# Patient Record
Sex: MALE | Race: WHITE | NOT HISPANIC OR LATINO
[De-identification: names, ages, dates, MRNs, and addresses within clinical notes are randomized per-mention and may not be internally consistent; named-entity substitution may affect disease eponyms.]

---

## 2023-01-12 PROBLEM — Z00.00 ENCOUNTER FOR PREVENTIVE HEALTH EXAMINATION: Status: ACTIVE | Noted: 2023-01-12

## 2023-01-19 ENCOUNTER — APPOINTMENT (OUTPATIENT)
Dept: NEUROSURGERY | Facility: CLINIC | Age: 58
End: 2023-01-19
Payer: SELF-PAY

## 2023-01-19 ENCOUNTER — APPOINTMENT (OUTPATIENT)
Dept: NEUROSURGERY | Facility: CLINIC | Age: 58
End: 2023-01-19

## 2023-01-19 PROCEDURE — EDU01: CPT

## 2023-01-20 NOTE — REASON FOR VISIT
[Medical Office: ([facility])___] : at the medical office located in  [Participant] : the participant [Other Location: e.g. School (Enter Location, City,State)___] : at [unfilled], at the time of the educational consult. [Self] : self [Follow-Up: _____] : a [unfilled] follow-up visit [FreeTextEntry5] : 57, Male [FreeTextEntry6] : lumbar pain, history of lumbar fusion [FreeTextEntry1] : 57 year old gentleman who has had a previous lumbar fusion, w/new onset pain with radiation posteriorly down midline of right leg. \par \par He had a spinal chord stimulator placed by Dr. Su and states that until recently it was working. He has been unable to get a new MRI due to battery compatability issue.\par \par A review of his new images occurred. He was informed that the previous fusion is solid. There is some arthritic changes at the levels directly above.\par \par Due to his inability to get a MRI at this time, I recommend a CT myelogram of his thoracic and lumbar spine as well as a full length standing scoliosis film.\par \par As per patients request, this office will reach out to his primary care physician Dr. Duncan to help obtain.\par \par Patient is currently dealing with other medical issues such as rectal bleeding and will obtain films as soon as his work up is completed.\par \par Patient will follow up once images are obtained.\par

## 2023-02-13 ENCOUNTER — APPOINTMENT (OUTPATIENT)
Dept: RADIOLOGY | Facility: HOSPITAL | Age: 58
End: 2023-02-13

## 2023-02-13 ENCOUNTER — OUTPATIENT (OUTPATIENT)
Dept: OUTPATIENT SERVICES | Facility: HOSPITAL | Age: 58
LOS: 1 days | End: 2023-02-13
Payer: COMMERCIAL

## 2023-02-13 ENCOUNTER — NON-APPOINTMENT (OUTPATIENT)
Age: 58
End: 2023-02-13

## 2023-02-13 ENCOUNTER — RESULT REVIEW (OUTPATIENT)
Age: 58
End: 2023-02-13

## 2023-02-13 ENCOUNTER — APPOINTMENT (OUTPATIENT)
Dept: NEUROSURGERY | Facility: CLINIC | Age: 58
End: 2023-02-13
Payer: COMMERCIAL

## 2023-02-13 DIAGNOSIS — M54.16 RADICULOPATHY, LUMBAR REGION: ICD-10-CM

## 2023-02-13 PROCEDURE — 72129 CT CHEST SPINE W/DYE: CPT

## 2023-02-13 PROCEDURE — 99204 OFFICE O/P NEW MOD 45 MIN: CPT

## 2023-02-13 PROCEDURE — 62305 MYELOGRAPHY LUMBAR INJECTION: CPT

## 2023-02-13 PROCEDURE — 72129 CT CHEST SPINE W/DYE: CPT | Mod: 26

## 2023-02-13 PROCEDURE — 72132 CT LUMBAR SPINE W/DYE: CPT | Mod: 26

## 2023-02-13 PROCEDURE — 72132 CT LUMBAR SPINE W/DYE: CPT

## 2023-02-14 ENCOUNTER — APPOINTMENT (OUTPATIENT)
Dept: NEUROSURGERY | Facility: CLINIC | Age: 58
End: 2023-02-14
Payer: COMMERCIAL

## 2023-02-14 ENCOUNTER — NON-APPOINTMENT (OUTPATIENT)
Age: 58
End: 2023-02-14

## 2023-02-14 VITALS
OXYGEN SATURATION: 95 % | WEIGHT: 265 LBS | HEIGHT: 73 IN | BODY MASS INDEX: 35.12 KG/M2 | SYSTOLIC BLOOD PRESSURE: 126 MMHG | DIASTOLIC BLOOD PRESSURE: 83 MMHG | HEART RATE: 75 BPM

## 2023-02-14 DIAGNOSIS — Z96.89 PRESENCE OF OTHER SPECIFIED FUNCTIONAL IMPLANTS: ICD-10-CM

## 2023-02-14 DIAGNOSIS — Z87.891 PERSONAL HISTORY OF NICOTINE DEPENDENCE: ICD-10-CM

## 2023-02-14 DIAGNOSIS — Z82.49 FAMILY HISTORY OF ISCHEMIC HEART DISEASE AND OTHER DISEASES OF THE CIRCULATORY SYSTEM: ICD-10-CM

## 2023-02-14 DIAGNOSIS — Z86.79 PERSONAL HISTORY OF OTHER DISEASES OF THE CIRCULATORY SYSTEM: ICD-10-CM

## 2023-02-14 DIAGNOSIS — Z78.9 OTHER SPECIFIED HEALTH STATUS: ICD-10-CM

## 2023-02-14 DIAGNOSIS — I10 ESSENTIAL (PRIMARY) HYPERTENSION: ICD-10-CM

## 2023-02-14 PROCEDURE — 99215 OFFICE O/P EST HI 40 MIN: CPT

## 2023-02-14 RX ORDER — VITAMIN D3/FOLIC ACID 95 MCG-1MG
TABLET ORAL
Refills: 0 | Status: ACTIVE | COMMUNITY

## 2023-02-14 RX ORDER — SIMVASTATIN 20 MG/1
20 TABLET, FILM COATED ORAL
Refills: 0 | Status: ACTIVE | COMMUNITY

## 2023-02-14 RX ORDER — GLUCOSAMINE/MSM/CHONDROIT SULF 500-166.6
10 TABLET ORAL
Refills: 0 | Status: ACTIVE | COMMUNITY

## 2023-02-14 RX ORDER — POTASSIUM CITRATE 15 MEQ/1
15 MEQ TABLET, EXTENDED RELEASE ORAL
Refills: 0 | Status: ACTIVE | COMMUNITY

## 2023-02-14 RX ORDER — CHLORTHALIDONE 25 MG/1
25 TABLET ORAL
Refills: 0 | Status: ACTIVE | COMMUNITY

## 2023-02-14 RX ORDER — CYCLOBENZAPRINE HYDROCHLORIDE 10 MG/1
10 TABLET, FILM COATED ORAL
Refills: 0 | Status: ACTIVE | COMMUNITY

## 2023-02-14 RX ORDER — ALLOPURINOL 100 MG/1
100 TABLET ORAL
Refills: 0 | Status: ACTIVE | COMMUNITY

## 2023-02-14 RX ORDER — OXYBUTYNIN CHLORIDE 5 MG/1
5 TABLET ORAL
Refills: 0 | Status: ACTIVE | COMMUNITY

## 2023-02-14 RX ORDER — ERYTHROMYCIN 5 MG/G
5 OINTMENT OPHTHALMIC
Refills: 0 | Status: ACTIVE | COMMUNITY

## 2023-02-14 RX ORDER — CHLORHEXIDINE GLUCONATE 4 %
LIQUID (ML) TOPICAL
Refills: 0 | Status: ACTIVE | COMMUNITY

## 2023-02-14 RX ORDER — PHENAZOPYRIDINE HYDROCHLORIDE 200 MG/1
200 TABLET ORAL
Refills: 0 | Status: ACTIVE | COMMUNITY

## 2023-02-14 RX ORDER — ACETAMINOPHEN 500 MG
TABLET ORAL
Refills: 0 | Status: ACTIVE | COMMUNITY

## 2023-02-14 RX ORDER — IBUPROFEN 600 MG/1
600 TABLET, FILM COATED ORAL
Refills: 0 | Status: ACTIVE | COMMUNITY

## 2023-02-14 NOTE — PHYSICAL EXAM
[General Appearance - Alert] : alert [General Appearance - In No Acute Distress] : in no acute distress [General Appearance - Well Nourished] : well nourished [General Appearance - Well-Appearing] : healthy appearing [Oriented To Time, Place, And Person] : oriented to person, place, and time [Affect] : the affect was normal [Mood] : the mood was normal [Person] : oriented to person [Place] : oriented to place [Time] : oriented to time [Cranial Nerves Oculomotor (III)] : extraocular motion intact [Cranial Nerves Trigeminal (V)] : facial sensation intact symmetrically [Cranial Nerves Facial (VII)] : face symmetrical [Cranial Nerves Vestibulocochlear (VIII)] : hearing was intact bilaterally [Cranial Nerves Glossopharyngeal (IX)] : tongue and palate midline [Cranial Nerves Accessory (XI - Cranial And Spinal)] : head turning and shoulder shrug symmetric [Cranial Nerves Hypoglossal (XII)] : there was no tongue deviation with protrusion [Sensation Tactile Decrease] : light touch was intact [Antalgic] : antalgic [] : no respiratory distress [Respiration, Rhythm And Depth] : normal respiratory rhythm and effort

## 2023-02-15 NOTE — PHYSICAL EXAM
[General Appearance - Alert] : alert [General Appearance - Well Nourished] : well nourished [General Appearance - Well Developed] : well developed [Oriented To Time, Place, And Person] : oriented to person, place, and time [Impaired Insight] : insight and judgment were intact [Affect] : the affect was normal [Mood] : the mood was normal [Motor Tone] : muscle tone was normal in all four extremities [Motor Strength] : muscle strength was normal in all four extremities [Antalgic] : antalgic [Able to toe walk] : the patient was able to toe walk [Able to heel walk] : the patient was able to heel walk [] : no respiratory distress [Exaggerated Use Of Accessory Muscles For Inspiration] : no accessory muscle use [Heart Rate And Rhythm] : heart rate was normal and rhythm regular [Edema] : there was no peripheral edema [Involuntary Movements] : no involuntary movements were seen [Musculoskeletal - Swelling] : no joint swelling seen [Skin Color & Pigmentation] : normal skin color and pigmentation [FreeTextEntry1] : ambulating with a cane; appeared uncomfortable with changing positions

## 2023-02-15 NOTE — ASSESSMENT
[FreeTextEntry1] : 57 year old man with previous lumbar fusion, spinal cord stimulator placement w/new onset pain with radiation posteriorly down midline of right leg.  Pain is described as burning and severe.\par \par EMG revealed severe, subacute and chronic bilateral L5 >S1 polyradiculopathy with interval worsening compared to previous EMG. CT myelogram  done today and showed right far lateral disc herniation at L3-4 with right neural foraminal stenosis; osteophyte at L5-S1 causing right neural foraminal stenosis.\par \par Plan:\par - No neurosurgical procedure at this time\par - Refer to Dr. Demarco for management of SCS\par

## 2023-02-15 NOTE — HISTORY OF PRESENT ILLNESS
[FreeTextEntry1] : 57 year old gentleman who has had a previous lumbar fusion, w/new onset pain with radiation posteriorly down midline of right leg. \par \par He had a spinal cord stimulator placed by Dr. Su and states that until recently it was working. Now it helped taking pain from 10/10 to 7/10. Pain is described as burning. He has been unable to get a new MRI due to battery compatability issue. A review of his new images occurred. He was informed that the previous fusion is solid. There is some arthritic changes at the levels directly above.\par \par Due to his inability to get a MRI at this time, I recommend a CT myelogram of his thoracic and lumbar spine as well as a full length standing scoliosis film.\par \par EMG revealed severe, subacute and chronic bilateral L5 >S1 polyradiculopathy with interval worsening compared to previous in 2017\par \par Pain started around June 2022 but has become intolerable since November 2022. Pain is accompanied by weakness in right leg. He has been using a cane for ambulation. He had CT myelogram  done today and showed right far lateral disc herniation at L3-4 with right neural foraminal stenosis; osteophyte at L5-S1 causing right neural foraminal stenosis.\par \par \par \par

## 2023-02-16 PROBLEM — I10 HIGH BLOOD PRESSURE: Status: ACTIVE | Noted: 2023-02-14

## 2023-02-16 NOTE — REASON FOR VISIT
[New Patient Visit] : a new patient visit [Referred By: _________] : Patient was referred by MICHAEL [Spouse] : spouse

## 2023-02-21 NOTE — ASSESSMENT
[FreeTextEntry1] : IMPRESSION:\par 57M with PMH of HTN, lumbar radiculopathy. He is s/p multiple spine surgeries and s/p spinal cord stimulator placement in January 2019. His pain was initially improved but worsened in November/December 2022. He c/o burning low back pain radiating to right glute and posteriorly down right thigh to above the knee. Had EMG that showed worsening polyradiculopathy L5 and S1 worse when compared with prior exam.\par \par Mandie from [employer] SCS team present with patient in office today. Impedances normal. After reviewing images completed 2/13/23, lead is placed towards left side. Mandie will run some new programs to optimize programming using right side of lead to cover as much space as possible. She will use combination settings. Pt did not see immediate improvement in office today, but she has multiple programs for him to try over the next few weeks. \par \par PLAN:\par Optimize programming as much as possible first. If unable to tolerate anymore and he is getting no symptomatic relief, will then decide if revision is warranted. \par There are multiple options if the patient chooses to undergo surgery. Existing system can be left and another lead can be placed higher, or the whole system can be removed or replaced. I would recommend leaving the existing system and adding another lead higher, but first exhausting all programming options prior to proceeding with surgery.\par Patient and spouse know to call the office if there is any new or worsening symptoms. Patient verbalized understanding and agreed to plan. \par Follow up as needed\par \par I, Dr. Mane Demarco evaluated the patient with nurse practitioner Mandie Parnell, and established the plan of care. I personally discussed this patient during the key portions of the history and exam with the nurse practitioner at the time of the visit. I agree with the assessment and plan as written, unless noted below.

## 2023-02-21 NOTE — HISTORY OF PRESENT ILLNESS
[> 3 months] : more  than 3 months [FreeTextEntry1] : back pain [de-identified] : GILDA MCMAHON is a 57 year old male with PMH of HTN, lumbar radiculopathy. PSH of multiple spinal surgeries. He had an L4-5 laminectomy in April 2015, and L4-5 fusion in May 2017 by Dr. Curry at [facility]. He most recently had SCS implant in January 2019 with [employer] system. He was referred by Dr. Curry for management of his spinal cord stimulator. His pain was well controlled but around November/December of 2022 he noticed that his severe pain returned. Pain is in lower back and radiates down right glute and posterior right thigh stopping above the knee. Describes as burning pain. No trauma at the time of onset. Rates pain 7-8/10. He is ambulating with a cane. His SCS was reprogrammed in 12/2022 and he feels some paresthesia from stimulation on the left only. He had an EMG done by Dr. Cr 2/6/23 revealed worsening L5 and S1 polyradiculopathy since last test 3/29/17.  CT thoracic/lumbar spine done 2/13/23 shows SCS placed at the T9-10 region. Appears the paddle is shifted towards the left. During his SCS trial in November 2018 he noted 50% improvement in pain. He noted improvement in low back pain, left numbness and pain. The pain improvement from his trial is better than the relief he is having right now. Denies urinary retention, urinary or bladder incontinence.

## 2023-02-21 NOTE — DATA REVIEWED
[de-identified] : CT thoracic/lumbar spine 2/2023 [facility] [de-identified] : x-ray myelography 2/2023 [facility]

## 2023-03-03 ENCOUNTER — NON-APPOINTMENT (OUTPATIENT)
Age: 58
End: 2023-03-03

## 2023-03-14 ENCOUNTER — APPOINTMENT (OUTPATIENT)
Dept: NEUROSURGERY | Facility: CLINIC | Age: 58
End: 2023-03-14
Payer: COMMERCIAL

## 2023-03-14 ENCOUNTER — NON-APPOINTMENT (OUTPATIENT)
Age: 58
End: 2023-03-14

## 2023-03-14 DIAGNOSIS — M54.16 RADICULOPATHY, LUMBAR REGION: ICD-10-CM

## 2023-03-14 DIAGNOSIS — G89.29 DORSALGIA, UNSPECIFIED: ICD-10-CM

## 2023-03-14 DIAGNOSIS — M54.9 DORSALGIA, UNSPECIFIED: ICD-10-CM

## 2023-03-14 PROCEDURE — 99443: CPT

## 2023-03-16 ENCOUNTER — NON-APPOINTMENT (OUTPATIENT)
Age: 58
End: 2023-03-16

## 2023-03-16 PROBLEM — M54.16 LUMBAR RADICULOPATHY: Status: ACTIVE | Noted: 2023-01-19

## 2023-03-16 PROBLEM — M54.9 CHRONIC BACK PAIN: Status: ACTIVE | Noted: 2023-02-16

## 2023-03-23 DIAGNOSIS — Z01.818 ENCOUNTER FOR OTHER PREPROCEDURAL EXAMINATION: ICD-10-CM

## 2023-03-30 ENCOUNTER — NON-APPOINTMENT (OUTPATIENT)
Age: 58
End: 2023-03-30

## 2023-04-06 ENCOUNTER — TRANSCRIPTION ENCOUNTER (OUTPATIENT)
Age: 58
End: 2023-04-06

## 2023-04-21 ENCOUNTER — NON-APPOINTMENT (OUTPATIENT)
Age: 58
End: 2023-04-21

## 2023-04-23 ENCOUNTER — TRANSCRIPTION ENCOUNTER (OUTPATIENT)
Age: 58
End: 2023-04-23

## 2023-04-24 ENCOUNTER — OUTPATIENT (OUTPATIENT)
Dept: OUTPATIENT SERVICES | Facility: HOSPITAL | Age: 58
LOS: 1 days | End: 2023-04-24
Payer: COMMERCIAL

## 2023-04-24 ENCOUNTER — APPOINTMENT (OUTPATIENT)
Dept: NEUROSURGERY | Facility: HOSPITAL | Age: 58
End: 2023-04-24

## 2023-04-24 ENCOUNTER — TRANSCRIPTION ENCOUNTER (OUTPATIENT)
Age: 58
End: 2023-04-24

## 2023-04-24 VITALS
SYSTOLIC BLOOD PRESSURE: 144 MMHG | OXYGEN SATURATION: 99 % | HEART RATE: 78 BPM | RESPIRATION RATE: 18 BRPM | DIASTOLIC BLOOD PRESSURE: 84 MMHG

## 2023-04-24 VITALS
TEMPERATURE: 97 F | HEIGHT: 73 IN | WEIGHT: 265 LBS | DIASTOLIC BLOOD PRESSURE: 81 MMHG | SYSTOLIC BLOOD PRESSURE: 132 MMHG | OXYGEN SATURATION: 98 % | HEART RATE: 77 BPM | RESPIRATION RATE: 20 BRPM

## 2023-04-24 DIAGNOSIS — M54.9 DORSALGIA, UNSPECIFIED: ICD-10-CM

## 2023-04-24 PROCEDURE — 63650 IMPLANT NEUROELECTRODES: CPT | Mod: 59

## 2023-04-24 PROCEDURE — C1778: CPT

## 2023-04-24 PROCEDURE — C1767: CPT

## 2023-04-24 PROCEDURE — 63685 INS/RPLC SPI NPG/RCVR POCKET: CPT

## 2023-04-24 PROCEDURE — 63655 IMPLANT NEUROELECTRODES: CPT

## 2023-04-24 PROCEDURE — 63662 REMOVE SPINE ELTRD PLATE: CPT

## 2023-04-24 PROCEDURE — 76000 FLUOROSCOPY <1 HR PHYS/QHP: CPT

## 2023-04-24 PROCEDURE — C1889: CPT

## 2023-04-24 DEVICE — BONE WAX 2.5GM: Type: IMPLANTABLE DEVICE | Site: BILATERAL | Status: FUNCTIONAL

## 2023-04-24 DEVICE — SURGIFLO MATRIX WITH THROMBIN KIT: Type: IMPLANTABLE DEVICE | Site: BILATERAL | Status: FUNCTIONAL

## 2023-04-24 DEVICE — GENERATOR NEUROSTIMULATOR 1.34X4.95X6.1CM: Type: IMPLANTABLE DEVICE | Site: BILATERAL | Status: FUNCTIONAL

## 2023-04-24 DEVICE — IMP LEAD SLIMTIP 50CM: Type: IMPLANTABLE DEVICE | Site: BILATERAL | Status: FUNCTIONAL

## 2023-04-24 DEVICE — SURGIFOAM PAD 8CM X 12.5CM X 10MM (100): Type: IMPLANTABLE DEVICE | Site: BILATERAL | Status: FUNCTIONAL

## 2023-04-24 RX ORDER — CHLORTHALIDONE 50 MG
1 TABLET ORAL
Refills: 0 | DISCHARGE

## 2023-04-24 RX ORDER — SODIUM CHLORIDE 9 MG/ML
1000 INJECTION, SOLUTION INTRAVENOUS
Refills: 0 | Status: DISCONTINUED | OUTPATIENT
Start: 2023-04-24 | End: 2023-05-08

## 2023-04-24 RX ORDER — POTASSIUM CITRATE MONOHYDRATE 100 %
1 POWDER (GRAM) MISCELLANEOUS
Refills: 0 | DISCHARGE

## 2023-04-24 RX ORDER — CHOLECALCIFEROL (VITAMIN D3) 125 MCG
1 CAPSULE ORAL
Refills: 0 | DISCHARGE

## 2023-04-24 RX ORDER — HYDROMORPHONE HYDROCHLORIDE 2 MG/ML
0.5 INJECTION INTRAMUSCULAR; INTRAVENOUS; SUBCUTANEOUS
Refills: 0 | Status: DISCONTINUED | OUTPATIENT
Start: 2023-04-24 | End: 2023-04-24

## 2023-04-24 RX ORDER — OXYCODONE HYDROCHLORIDE 5 MG/1
5 TABLET ORAL ONCE
Refills: 0 | Status: DISCONTINUED | OUTPATIENT
Start: 2023-04-24 | End: 2023-04-24

## 2023-04-24 RX ORDER — ASPIRIN/CALCIUM CARB/MAGNESIUM 324 MG
0 TABLET ORAL
Refills: 0 | DISCHARGE

## 2023-04-24 RX ORDER — SIMVASTATIN 20 MG/1
1 TABLET, FILM COATED ORAL
Refills: 0 | DISCHARGE

## 2023-04-24 RX ORDER — CEPHALEXIN 500 MG
1 CAPSULE ORAL
Qty: 14 | Refills: 0
Start: 2023-04-24 | End: 2023-04-30

## 2023-04-24 RX ORDER — ALLOPURINOL 300 MG
100 TABLET ORAL
Refills: 0 | DISCHARGE

## 2023-04-24 RX ORDER — ONDANSETRON 8 MG/1
4 TABLET, FILM COATED ORAL ONCE
Refills: 0 | Status: DISCONTINUED | OUTPATIENT
Start: 2023-04-24 | End: 2023-05-08

## 2023-04-24 RX ORDER — LANOLIN ALCOHOL/MO/W.PET/CERES
1 CREAM (GRAM) TOPICAL
Refills: 0 | DISCHARGE

## 2023-04-24 RX ORDER — CYCLOBENZAPRINE HYDROCHLORIDE 10 MG/1
1 TABLET, FILM COATED ORAL
Refills: 0 | DISCHARGE

## 2023-04-24 RX ORDER — SODIUM CHLORIDE 9 MG/ML
3 INJECTION INTRAMUSCULAR; INTRAVENOUS; SUBCUTANEOUS EVERY 8 HOURS
Refills: 0 | Status: DISCONTINUED | OUTPATIENT
Start: 2023-04-24 | End: 2023-05-08

## 2023-04-24 RX ADMIN — SODIUM CHLORIDE 100 MILLILITER(S): 9 INJECTION, SOLUTION INTRAVENOUS at 10:54

## 2023-04-24 RX ADMIN — SODIUM CHLORIDE 3 MILLILITER(S): 9 INJECTION INTRAMUSCULAR; INTRAVENOUS; SUBCUTANEOUS at 10:54

## 2023-04-24 NOTE — ASU PATIENT PROFILE, ADULT - FALL HARM RISK - RISK INTERVENTIONS

## 2023-04-24 NOTE — ASU DISCHARGE PLAN (ADULT/PEDIATRIC) - ASU DC SPECIAL INSTRUCTIONSFT
Antibiotics: patient is being prescribed post op abx which he should took for a total of 7 days    Dressing: Should keep dressing dry for 48 hours. Ok to shower afterwards but do not submerge underwater. Pat dry.     Follow-up: Please see Dr. Demarco in office as outpatient in 10-14 days from [place] or earlier if needed    Pain: Patient my take tylenol every 6 hours for pain.    Okay to restart Aspirin 81 tomorrow Antibiotics: patient is being prescribed post op abx which he should took for a total of 7 days  ******************************************************************************************   Dressing: Should keep dressing dry for 48 hours. Ok to shower afterwards but do not submerge underwater. Pat dry.   ******************************************************************************************   Follow-up: Please see Dr. Demarco in office as outpatient in 10-14 days from [place] or earlier if needed  ******************************************************************************************   Pain: Patient my take tylenol every 6 hours for pain.  ******************************************************************************************   Okay to restart Aspirin 81 tomorrow

## 2023-04-24 NOTE — PRE-ANESTHESIA EVALUATION ADULT - NSANTHOSAYNRD_GEN_A_CORE
No. GOLDIE screening performed.  STOP BANG Legend: 0-2 = LOW Risk; 3-4 = INTERMEDIATE Risk; 5-8 = HIGH Risk

## 2023-04-24 NOTE — PRE-ANESTHESIA EVALUATION ADULT - HEIGHT IN CM
DATE:  06/04/2018



SUBJECTIVE:  The patient seen and examined.  The patient is lying in the bed. 

No new event.  No new concern from the staff.



PHYSICAL EXAMINATION:

VITAL SIGNS:  Temperature 97.6, pulse is 64, respirations 18, and blood pressure

144/84.

HEENT:  No facial asymmetry.  Poor dentition noted.

NECK:  Supple, no JVD.

HEART:  Regular.

CHEST AND LUNGS:  Equal in expansion, no wheezing, no crackles.

ABDOMEN:  Soft.  No guarding or rigidity.  Bowel sounds present.  No palpable

mass.

EXTREMITIES:  No edema.



CLINICAL IMPRESSION:

1.  Coronary artery disease.

2.  Hypertension.

3.  Degenerative joint disease.

4.  Psychotic disorder.

5.  Obesity.



PLAN:

1.  Psychotic evaluation and management deferred to psychiatrist.

2.  Symptoms management.

3.  General nursing care.

4.  Antihypertensive medicine.

5.  Follow labs.

6.  We will continue to follow this patient during the stay in the hospital.





DD: 06/04/2018 09:54

DT: 06/04/2018 23:17

JOB# [id number]  [id number]

## 2023-04-24 NOTE — ASU DISCHARGE PLAN (ADULT/PEDIATRIC) - CARE PROVIDER_API CALL
Mane Demarco (MD)  Neurosurgery  [address], [address]  [place] [address]  Phone: [phone]  Fax: [phone]  Follow Up Time: 2 weeks

## 2023-04-24 NOTE — ASU DISCHARGE PLAN (ADULT/PEDIATRIC) - NS MD DC FALL RISK RISK
For information on Fall & Injury Prevention, visit: [email or web address] OR  [email or web address] OR  https://www.cdc.gov/steadi/patient.html

## 2023-04-24 NOTE — ASU DISCHARGE PLAN (ADULT/PEDIATRIC) - NURSING INSTRUCTIONS
OK to take Tylenol/Acetaminophen at 7:30PM TONIGHT 4/24 (last dose @  1:30PM   in operating room)  for pain and every 6 hours after as needed. Antibiotic sent to pharmacy, PLEASE FINISH FULL 7 DAY COURSE.

## 2023-04-24 NOTE — H&P ADULT - HISTORY OF PRESENT ILLNESS
GILDA MCMAHON was seen in the office 2/14/23 for management of SCS. He had complex programming in office by Mandie from [employer]. She gave him 5 new programs in attempt to optimize settings with current SCS. He tried each of the 5 programs for 3 days with no relief in symptoms.  He has burning in the right hamstring and back of right leg. Since trying new programs he has noticed more tingling in the right foot than he has ever had in the past. His leg seems weaker and he relies on his cane more. He is here now for b/l implantation of S1 DRG stimulators and removal of his old [employer] paddle stimulator.

## 2023-04-24 NOTE — H&P ADULT - ASSESSMENT
GILDA MCMAHON was seen in the office 2/14/23 for management of SCS. He had complex programming in office by Mandie from [employer]. She gave him 5 new programs in attempt to optimize settings with current SCS. He tried each of the 5 programs for 3 days with no relief in symptoms.  He has burning in the right hamstring and back of right leg. Since trying new programs he has noticed more tingling in the right foot than he has ever had in the past. His leg seems weaker and he relies on his cane more. He is here now for b/l implantation of S1 DRG stimulators and removal of his old [employer] paddle stimulator.

## 2023-04-24 NOTE — H&P ADULT - NSHPPHYSICALEXAM_GEN_ALL_CORE
Exam: AO3, PERRL, EOMI, R HF 4+/5 (pain limited), otherwise NEGRO 5/5 but says he has worsened foot drop and paresthesias with ambulation.

## 2023-04-25 ENCOUNTER — APPOINTMENT (OUTPATIENT)
Dept: NEUROSURGERY | Facility: CLINIC | Age: 58
End: 2023-04-25
Payer: COMMERCIAL

## 2023-04-25 VITALS
HEIGHT: 73 IN | TEMPERATURE: 98.2 F | WEIGHT: 265 LBS | OXYGEN SATURATION: 97 % | SYSTOLIC BLOOD PRESSURE: 125 MMHG | DIASTOLIC BLOOD PRESSURE: 82 MMHG | HEART RATE: 83 BPM | BODY MASS INDEX: 35.12 KG/M2

## 2023-04-25 PROCEDURE — 99024 POSTOP FOLLOW-UP VISIT: CPT

## 2023-04-25 RX ORDER — ASPIRIN 81 MG/1
81 TABLET ORAL
Refills: 0 | Status: DISCONTINUED | COMMUNITY
End: 2023-04-25

## 2023-04-25 RX ORDER — DOCUSATE SODIUM 100 MG
TABLET ORAL
Refills: 0 | Status: DISCONTINUED | COMMUNITY
End: 2023-04-25

## 2023-04-28 NOTE — PHYSICAL EXAM
[General Appearance - Alert] : alert [General Appearance - In No Acute Distress] : in no acute distress [General Appearance - Well Nourished] : well nourished [General Appearance - Well-Appearing] : healthy appearing [Dry] : dry [Intact] : intact [No Drainage] : without drainage [Normal Skin] : normal [Oriented To Time, Place, And Person] : oriented to person, place, and time [Affect] : the affect was normal [Mood] : the mood was normal [Person] : oriented to person [Place] : oriented to place [Time] : oriented to time [Cranial Nerves Oculomotor (III)] : extraocular motion intact [Cranial Nerves Trigeminal (V)] : facial sensation intact symmetrically [Cranial Nerves Facial (VII)] : face symmetrical [Cranial Nerves Vestibulocochlear (VIII)] : hearing was intact bilaterally [Cranial Nerves Glossopharyngeal (IX)] : tongue and palate midline [Cranial Nerves Accessory (XI - Cranial And Spinal)] : head turning and shoulder shrug symmetric [Cranial Nerves Hypoglossal (XII)] : there was no tongue deviation with protrusion [Sensation Tactile Decrease] : light touch was intact [] : no respiratory distress [Respiration, Rhythm And Depth] : normal respiratory rhythm and effort [Erythema] : not erythematous [Tender] : not tender [Warm] : not warm [Indurated] : not indurated [FreeTextEntry1] : incisions noted above

## 2023-04-28 NOTE — HISTORY OF PRESENT ILLNESS
[> 3 months] : more  than 3 months [FreeTextEntry1] : back pain [de-identified] : GILDA MCMAHON is a 57 year old male with PMH of HTN, lumbar radiculopathy. PSH of multiple spinal surgeries. He had an L4-5 laminectomy in April 2015, and L4-5 fusion in May 2017 by Dr. Curry at [facility]. He most recently had SCS implant in January 2019 with [employer] system. He was referred by Dr. Curry for management of his spinal cord stimulator. His pain was well controlled but around November/December of 2022 he noticed that his severe pain returned. Pain is in lower back and radiates down right glute and posterior right thigh stopping above the knee. Describes as burning pain. No trauma at the time of onset. Rates pain 7-8/10. He is ambulating with a cane. His SCS was reprogrammed in 12/2022 and he feels some paresthesia from stimulation on the left only. He had an EMG done by Dr. Cr 2/6/23 revealed worsening L5 and S1 polyradiculopathy since last test 3/29/17.  CT thoracic/lumbar spine done 2/13/23 shows SCS placed at the T9-10 region. Appears the paddle is shifted towards the left. During his SCS trial in November 2018 he noted 50% improvement in pain. He noted improvement in low back pain, left numbness and pain. The pain improvement from his trial is better than the relief he is having right now. Denies urinary retention, urinary or bladder incontinence. \par \par 4/25/23 He presents to the office for post op follow up. He underwent removal of [employer] paddle SCS and battery, and implant of b/l S1 DRG stimulator and battery. He is feeling well today. Reports incisional pain. Hiram goss present in office for complex programming. No new bleeding from surgical incisions. No new complaints.

## 2023-04-28 NOTE — REASON FOR VISIT
[Spouse] : spouse [de-identified] : s/p removal of paddle SCS and battery, placement of b/l S1 DRG stimulator and battery [de-identified] : 4/24/23

## 2023-04-28 NOTE — ASSESSMENT
[FreeTextEntry1] : IMPRESSION:\par 57M with PMH of HTN, lumbar radiculopathy. He is s/p multiple spine surgeries and s/p spinal cord stimulator placement in January 2019. He is s/p removal of existing SCS/battery, and implant of b/l S1 DRG stimulator 4/24/23.\par \par Pt spoke with the representative in the office about the stimulator. He underwent complex programming making changes to the millivolt frequency and pulse width. \par \par Patient clinically doing well. Improvement in pain when stimulation increased. Reports other than incisional pain that his back pain is improved, although he has a slight pain in posterior right thigh. Surgical incisions WNL. The surrounding skin was normal, not erythematous, nontender, not warm and not indurated. Dressings intact but with some old bloody drainage. No new bleeding from incisions. Staples intact. Dressings changed in office today.\par \par PLAN:\par Can remove dressings tomorrow. Do not submerge incisions in water, can shower and pat dry. \par Return to clinic in 2 weeks for assessment of incisions and staple removal, and programming adjustments if needed.

## 2023-04-28 NOTE — REVIEW OF SYSTEMS
[As Noted in HPI] : as noted in HPI [Negative] : Heme/Lymph [Arthralgias] : no arthralgias [de-identified] : surgical incisions

## 2023-05-09 ENCOUNTER — APPOINTMENT (OUTPATIENT)
Dept: NEUROSURGERY | Facility: CLINIC | Age: 58
End: 2023-05-09
Payer: COMMERCIAL

## 2023-05-09 VITALS
OXYGEN SATURATION: 96 % | HEART RATE: 85 BPM | SYSTOLIC BLOOD PRESSURE: 155 MMHG | WEIGHT: 265 LBS | BODY MASS INDEX: 35.12 KG/M2 | HEIGHT: 73 IN | TEMPERATURE: 97.8 F | DIASTOLIC BLOOD PRESSURE: 99 MMHG

## 2023-05-09 DIAGNOSIS — M54.17 RADICULOPATHY, THORACIC REGION: ICD-10-CM

## 2023-05-09 DIAGNOSIS — M54.14 RADICULOPATHY, THORACIC REGION: ICD-10-CM

## 2023-05-09 DIAGNOSIS — M96.1 POSTLAMINECTOMY SYNDROME, NOT ELSEWHERE CLASSIFIED: ICD-10-CM

## 2023-05-09 PROCEDURE — 99024 POSTOP FOLLOW-UP VISIT: CPT

## 2023-05-12 NOTE — PHYSICAL EXAM
[General Appearance - Alert] : alert [General Appearance - In No Acute Distress] : in no acute distress [General Appearance - Well Nourished] : well nourished [General Appearance - Well-Appearing] : healthy appearing [Clean] : clean [Dry] : dry [Intact] : intact [Staple Intact] : closed with intact staples [No Drainage] : without drainage [Normal Skin] : normal [Oriented To Time, Place, And Person] : oriented to person, place, and time [Affect] : the affect was normal [Mood] : the mood was normal [Person] : oriented to person [Place] : oriented to place [Time] : oriented to time [Motor Strength] : muscle strength was normal in all four extremities [] : no respiratory distress [Respiration, Rhythm And Depth] : normal respiratory rhythm and effort [Abnormal Walk] : normal gait [Erythema] : not erythematous [Tender] : not tender [Warm] : not warm [FreeTextEntry1] : surgical incisions noted above

## 2023-05-12 NOTE — HISTORY OF PRESENT ILLNESS
[> 3 months] : more  than 3 months [FreeTextEntry1] : back pain [de-identified] : GILDA MCMAHON is a 57 year old male with PMH of HTN, lumbar radiculopathy. PSH of multiple spinal surgeries. He had an L4-5 laminectomy in April 2015, and L4-5 fusion in May 2017 by Dr. Curry at [facility]. He most recently had SCS implant in January 2019 with [employer] system. He was referred by Dr. Curry for management of his spinal cord stimulator. His pain was well controlled but around November/December of 2022 he noticed that his severe pain returned. Pain is in lower back and radiates down right glute and posterior right thigh stopping above the knee. Describes as burning pain. No trauma at the time of onset. Rates pain 7-8/10. He is ambulating with a cane. His SCS was reprogrammed in 12/2022 and he feels some paresthesia from stimulation on the left only. He had an EMG done by Dr. Cr 2/6/23 revealed worsening L5 and S1 polyradiculopathy since last test 3/29/17.  CT thoracic/lumbar spine done 2/13/23 shows SCS placed at the T9-10 region. Appears the paddle is shifted towards the left. During his SCS trial in November 2018 he noted 50% improvement in pain. He noted improvement in low back pain, left numbness and pain. The pain improvement from his trial is better than the relief he is having right now. Denies urinary retention, urinary or bladder incontinence. \par \par He presents to the office for post op follow up for assessment of surgical incisions and staple removal. He underwent removal of [employer] paddle SCS and battery, and implant of b/l S1 DRG stimulator and battery on 4/24/23. He reports feeling great, he still has the burning pain in back of right thigh. Otherwise he is walking better, no longer using cane to ambulate. His pain is controlled with OTC medication. Denies fevers, no bleeding or drainage from surgical incisions.

## 2023-05-12 NOTE — REASON FOR VISIT
[de-identified] : s/p removal of paddle SCS and battery, placement of b/l S1 DRG stimulator and battery [de-identified] : 4/24/23 [Spouse] : spouse

## 2023-05-12 NOTE — ASSESSMENT
[FreeTextEntry1] : IMPRESSION:\par 57M with PMH of HTN, lumbar radiculopathy. He is s/p multiple spine surgeries and s/p spinal cord stimulator placement in January 2019. He is s/p removal of existing SCS/battery, and implant of b/l S1 DRG stimulator 4/24/23. He presents for post op visit. He is feeling great, pain controlled with OTC medication. Still having burning pain in right posterior thigh.\par \par Patient clinically doing well. Thoracic spine, b/l sacral spine, right buttock incisions clean, dry, healing well and without drainage.  The surrounding skin was normal, not erythematous, nontender, not warm and not indurated. No signs and symptoms of infection. Staples intact, removed staples in office today with no issues. Advised to continue washing area with gentle soap to prevent infection and to avoid picking at scabs and the incision. Assured it is normal to feel itchiness from scab formation. \par \par  \par \par \par PLAN:\par If burning pain in right posterior thigh continues for a couple more weeks contact Shyam from [facility] for remote programming adjustment. \par Follow up prn, for complications of device or surgical incisions. \par \par \par I, Dr. Mane Demarco, personally performed the evaluation and management (E/M) services for this established patient who presents today with (a) new problem(s)/exacerbation of (an) existing condition(s).  That E/M includes conducting the clinically appropriate interval history &/or exam, assessing all new/exacerbated conditions, and establishing a new plan of care.  Today, my JOB, Mandie Parnell, was here to observe my evaluation and management service for this new problem/exacerbated condition and follow the plan of care established by me going forward.\par \par  \par \par \par

## 2025-01-28 ENCOUNTER — APPOINTMENT (OUTPATIENT)
Dept: NEUROSURGERY | Facility: CLINIC | Age: 60
End: 2025-01-28
Payer: COMMERCIAL

## 2025-01-28 DIAGNOSIS — M54.50 LOW BACK PAIN, UNSPECIFIED: ICD-10-CM

## 2025-01-28 PROCEDURE — 99212 OFFICE O/P EST SF 10 MIN: CPT

## 2025-01-30 PROBLEM — M54.50 ACUTE RIGHT-SIDED LOW BACK PAIN, UNSPECIFIED WHETHER SCIATICA PRESENT: Status: ACTIVE | Noted: 2025-01-30

## (undated) DEVICE — GLV 8.5 PROTEXIS (BLUE)

## (undated) DEVICE — SUT SOFSILK 2-0 18" C-23

## (undated) DEVICE — STAPLER SKIN VISI-STAT 35 WIDE

## (undated) DEVICE — SUT SOFSILK 2-0 30" V-20

## (undated) DEVICE — WARMING BLANKET LOWER ADULT

## (undated) DEVICE — BLADE SURGICAL #10 CARBON

## (undated) DEVICE — PREP CHLORAPREP HI-LITE ORANGE 26ML

## (undated) DEVICE — DRSG TEGADERM + PAD 3.5X4"

## (undated) DEVICE — MARKING PEN W RULER

## (undated) DEVICE — Device

## (undated) DEVICE — MEDICATION LABELS W MARKER

## (undated) DEVICE — SUT VICRYL 2-0 18" CP-2 UNDYED (POP-OFF)

## (undated) DEVICE — ELCTR SUBDERMAL CORKSCREW NDL 1.2MM

## (undated) DEVICE — DRAPE 1/2 SHEET 40X57"

## (undated) DEVICE — DRAPE 3/4 SHEET W REINFORCEMENT 56X77"

## (undated) DEVICE — GLV 8 PROTEXIS (WHITE)

## (undated) DEVICE — GLV 7.5 PROTEXIS (WHITE)

## (undated) DEVICE — DRAPE TOWEL BLUE 17" X 24"

## (undated) DEVICE — SYR LOR GLASS 5ML

## (undated) DEVICE — SOL IRR POUR H2O 250ML

## (undated) DEVICE — PACK MINOR

## (undated) DEVICE — TOOL TUNNELING 12"

## (undated) DEVICE — VENODYNE/SCD SLEEVE CALF LARGE

## (undated) DEVICE — SUT VICRYL 0 18" OS-6 (POP-OFF)

## (undated) DEVICE — SPECIMEN CONTAINER 100ML

## (undated) DEVICE — BIPOLAR FORCEP SYMMETRY BAYONET 7" X 1.5MM SMOOTH (SILVER)

## (undated) DEVICE — DRAPE MAYO STAND 30"

## (undated) DEVICE — DRAPE EQUIPMENT COVER 27"

## (undated) DEVICE — SOL IRR POUR NS 0.9% 500ML

## (undated) DEVICE — DRSG TEGADERM 3.5X6"